# Patient Record
Sex: MALE | Race: WHITE
[De-identification: names, ages, dates, MRNs, and addresses within clinical notes are randomized per-mention and may not be internally consistent; named-entity substitution may affect disease eponyms.]

---

## 2020-11-17 ENCOUNTER — HOSPITAL ENCOUNTER (OUTPATIENT)
Dept: HOSPITAL 97 - PRE | Age: 60
Discharge: HOME | End: 2020-11-17
Attending: UROLOGY
Payer: COMMERCIAL

## 2020-11-17 VITALS — TEMPERATURE: 97.3 F | SYSTOLIC BLOOD PRESSURE: 92 MMHG | OXYGEN SATURATION: 88 % | DIASTOLIC BLOOD PRESSURE: 39 MMHG

## 2020-11-17 DIAGNOSIS — Z20.828: ICD-10-CM

## 2020-11-17 DIAGNOSIS — Z87.891: ICD-10-CM

## 2020-11-17 DIAGNOSIS — E66.9: ICD-10-CM

## 2020-11-17 DIAGNOSIS — N47.1: Primary | ICD-10-CM

## 2020-11-17 DIAGNOSIS — Z79.84: ICD-10-CM

## 2020-11-17 DIAGNOSIS — I10: ICD-10-CM

## 2020-11-17 DIAGNOSIS — N47.7: ICD-10-CM

## 2020-11-17 DIAGNOSIS — E11.9: ICD-10-CM

## 2020-11-17 LAB
BUN BLD-MCNC: 14 MG/DL (ref 7–18)
GLUCOSE SERPLBLD-MCNC: 104 MG/DL (ref 74–106)
HCT VFR BLD CALC: 49.1 % (ref 39.6–49)
INR BLD: 1.03
LYMPHOCYTES # SPEC AUTO: 1.6 K/UL (ref 0.7–4.9)
PMV BLD: 8.4 FL (ref 7.6–11.3)
POTASSIUM SERPL-SCNC: 3.6 MMOL/L (ref 3.5–5.1)
RBC # BLD: 5.43 M/UL (ref 4.33–5.43)

## 2020-11-17 PROCEDURE — 71046 X-RAY EXAM CHEST 2 VIEWS: CPT

## 2020-11-17 PROCEDURE — 85025 COMPLETE CBC W/AUTO DIFF WBC: CPT

## 2020-11-17 PROCEDURE — 0VTTXZZ RESECTION OF PREPUCE, EXTERNAL APPROACH: ICD-10-PCS

## 2020-11-17 PROCEDURE — 85730 THROMBOPLASTIN TIME PARTIAL: CPT

## 2020-11-17 PROCEDURE — 93005 ELECTROCARDIOGRAM TRACING: CPT

## 2020-11-17 PROCEDURE — 85610 PROTHROMBIN TIME: CPT

## 2020-11-17 PROCEDURE — 36415 COLL VENOUS BLD VENIPUNCTURE: CPT

## 2020-11-17 PROCEDURE — 82947 ASSAY GLUCOSE BLOOD QUANT: CPT

## 2020-11-17 PROCEDURE — 80048 BASIC METABOLIC PNL TOTAL CA: CPT

## 2020-11-17 PROCEDURE — 88304 TISSUE EXAM BY PATHOLOGIST: CPT

## 2020-11-17 NOTE — XMS REPORT
Continuity of Care Document

                          Created on:2020



Patient:NATHEN HUDSON

Sex:Male

:1960

External Reference #:[id number]





Demographics







                          Address                   [address] [place] [address]

 

                          Home Phone                [phone]

 

                          Work Phone                [phone]

 

                          Email Address             [email or web address]

 

                          Preferred Language        English

 

                          Marital Status            Unknown

 

                          Adventism Affiliation     Unknown

 

                          Race                      Unknown

 

                          Additional Race(s)        White



                                                    Unavailable

 

                          Ethnic Group              Not  or 









Author







                          Organization              [facility]

t

 

                          Address                   [address]



                                                    [place] [address]

 

                          Phone                     [phone]









Support







                Name            Relationship    Address         Phone

 

                ESTUARDO HERNDON DO Emergency Provider [address]      [phone]

[address]



                                                [place] [address] 

 

                ÁNGELA RUBIO       Primary Care Physician [address] [place] [phone]



                                                [address]       



                                                [place] [address] 

 

                TRISTAN        Next of Kin     [address]      [phone]



                                                [place] [address] 









Care Team Providers







                    Name                Role                Phone

 

                    Unavailable         Unavailable         Unavailable









Problems







       Condition Condition Condition Status Onset  Resolution Last   Treating Co

mments 

Source



       Name   Details Category        Date   Date   Treatment Clinician        



                                                 Date                 

 

       Adjustment Adjustment Problem Active 2020-0                             M

[facility]



       disorder Disorder               3-20                               da



                                   00:00:                             [facility]



                                   00                                 al



                                                                      Health



                                                                      Outreac



                                                                      h



                                                                      Program

 

       Impotence Impotence Problem Active                                    [facility]

[facility]



                                                                      da



                                                                      Medical



                                                                      Group

 

       Carpal Carpal Problem Active                                    [facility]



       tunnel Tunnel                                                  da



       syndrome Syndrome                                                  Medica

l



                                                                      Group

 

       Otitis Otitis Problem Active                                    [facility]



       externa Externa                                                  da



                                                                      Medical



                                                                      Group

 

       Serous Serous Problem Active                                    [facility]



       otitis Otitis                                                  da



       media  Media                                                   Medical



                                                                      Group

 

       Tinnitus Tinnitus Problem Active                                    [facility]

or



                                                                      da



                                                                      Medical



                                                                      Group

 

       Otalgia Otalgia Problem Active                                    [facility]



                                                                      da



                                                                      Medical



                                                                      Group

 

       Hearing Hearing Problem Active                                    [facility]



       loss   Loss                                                    da



                                                                      Medical



                                                                      Group

 

       Essential Essential Problem Active                                    [facility]

[facility]



       hypertensi Hypertensi                                                  da



       on     on                                                      Medical



                                                                      Group

 

       Allergic Allergic Problem Active                                    [facility]

or



       rhinitis Rhinitis                                                  da



                                                                      Medical



                                                                      Group

 

       Skin   Skin   Problem Active                                    [facility]



       lesion Lesion                                                  da



                                                                      Medical



                                                                      Group

 

       Fatigue Fatigue Problem Active                                    [facility]



                                                                      da



                                                                      Medical



                                                                      Group

 

       Paresthesi Paresthesi Problem Active                                    [facility]

[facility]



       a      a                                                       da



                                                                      Medical



                                                                      Group







Allergies, Adverse Reactions, Alerts

This patient has no known allergies or adverse reactions.



Social History







                Smoking Status  Start Date      Stop Date       Source

 

                Former Smoker                                   [facility]

l Group







Medications







       Ordered Filled Start  Stop   Current Ordering Indication Dosage Frequency

 Signature

                    Comments            Components          Source



     Medication Medication Date Date Medication? Clinician                (SIG) 

          



     Name Name                                                   

 

     furosemide furosemide           No                       furosemide        

   [facility]



     20 mg 20 mg                                    20 mg           da



     tablet tablet                                    tablet           [facility]



                                                                 al



                                                                 Health



                                                                 Outre



                                                                 h



                                                                 Program

 

     metformin metformin           No                       metformin           

[facility]



     500 mg 500 mg                                    500 mg           da



     tablet tablet                                    tablet           [facility]



                                                                 Outre



                                                                 h



                                                                 Program

 

     olmesartan olmesartan           No                       olmesartan        

   [facility]



     20   20                                      20             da



     mg-hydrochl mg-hydrochl                                    mg-hydroch      

     [facility]



     orothiazide orothiazide                                    lorothiazi      

     al



     12.5 mg 12.5 mg                                    de 12.5 mg           [facility]

lth



     tablet tablet                                    tablet           Outre



                                                                 h



                                                                 Program

 

     pantoprazol pantoprazol           No                       pantoprazo      

     [facility]



     e 40 mg e 40 mg                                    le 40 mg           da



     tablet,arik tablet,arik                                    tablet,del      

     [facility]



     yed release yed release                                    ayed           a

l



                                                  release           [facility]



                                                                 h



                                                                 Program

 

     sulfamethox sulfamethox           No                       sulfametho      

     [facility]



     azole 800 azole 800                                    xazole 800          

 da



     mg-trimetho mg-trimetho                                    mg-trimeth      

     [facility]



     prim 160 mg prim 160 mg                                    oprim 160       

    al



     tablet tablet                                    mg tablet           [facility]



                                                                 Outre



                                                                 h



                                                                 Program

 

     tramadol 50 tramadol 50           No                       tramadol        

   [facility]



     mg tablet mg tablet                                    50 mg           da



                                                  tablet           [facility]



                                                                 Outre



                                                                 h



                                                                 Program

 

     doxycycline doxycycline           No                       doxycyclin      

     [facility]



     hyclate 100 hyclate 100                                    e hyclate       

    da



     mg capsule mg capsule                                    100 mg           M

edical



     Take 1 Take 1                                    capsule           Group



     capsule capsule                                    Take 1           



     twice a day twice a day                                    capsule         

  



     by oral by oral                                    twice a           



     route for route for                                    day by           



     10 days. 10 days.                                    oral route           



                                                  for 10           



                                                  days.           

 

     furosemide furosemide           No                       furosemide        

   [facility]



     20 mg 20 mg                                    20 mg           da



     tablet Take tablet Take                                    tablet          

 Medical



     1 tablet 1 tablet                                    Take 1           Group



     every day every day                                    tablet           



     by oral by oral                                    every day           



     route for route for                                    by oral           



     30 days. 30 days.                                    route for           



                                                  30 days.           

 

     metformin metformin           No                       metformin           

[facility]



     500 mg 500 mg                                    500 mg           da



     tablet Take tablet Take                                    tablet          

 Medical



     1 tablet 1 tablet                                    Take 1           Group



     every day every day                                    tablet           



     by oral by oral                                    every day           



     route for route for                                    by oral           



     30 days. 30 days.                                    route for           



                                                  30 days.           

 

     olmesartan olmesartan           No                       olmesartan        

   [facility]



     20   20                                      20             da



     mg-hydrochl mg-hydrochl                                    mg-hydroch      

     Medical



     orothiazide orothiazide                                    lorothiazi      

     Group



     12.5 mg 12.5 mg                                    de 12.5 mg           



     tablet TAKE tablet TAKE                                    tablet          

 



     1 TABLET BY 1 TABLET BY                                    TAKE 1          

 



     MOUTH MOUTH                                    TABLET BY           



     DAILY DAILY                                    MOUTH           



                                                  DAILY           







Immunizations







           Ordered Immunization Filled Immunization Date       Status     Commen

ts   Source



           Name       Name                                        

 

           Tdap       Tdap       2019 Completed             [facility]



                                 09:21:00                         Medical Group







Vital Signs







             Vital Name   Observation Time Observation Value Comments     Source

 

             BP Diastolic 2020 00:00:00 72 mm[Hg]                 [facility]

a [facility]



                                                                 Group

 

             Height       2020 00:00:00 70 [in_i]                 [facility]

a [facility]



                                                                 Group

 

             BMI (Body Mass 2020 00:00:00 41.6 kg/m2                [facility]



             Index)                                              Group

 

             BP Systolic  2020 00:00:00 116 mm[Hg]                [facility]

a [facility]



                                                                 Group

 

             Body Weight  2020 00:00:00 290 [lb_av]               [facility]

a [facility]



                                                                 Group

 

             BP Diastolic 2020 00:00:00 72 mm[Hg]                 [facility]

a [facility]



                                                                 Group

 

             Height       2020 00:00:00 70 [in_i]                 [facility]

a [facility]



                                                                 Group

 

             BMI (Body Mass 2020 00:00:00 42.3 kg/m2                [facility]

rda Medical



             Index)                                              Group

 

             BP Systolic  2020 00:00:00 141 mm[Hg]                [facility]

a [facility]



                                                                 Group

 

             Body Weight  2020 00:00:00 4720 [oz_av]              [facility]

a [facility]



                                                                 Group

 

             BP Diastolic 2020 00:00:00 69 mm[Hg]                 [facility]

a [facility]



                                                                 Group

 

             Height       2020 00:00:00 70 [in_i]                 [facility]

a [facility]



                                                                 Group

 

             BMI (Body Mass 2020 00:00:00 42.7 kg/m2                [facility]

[facility]da Medical



             Index)                                              Group

 

             BP Systolic  2020 00:00:00 115 mm[Hg]                [facility]

a [facility]



                                                                 Group

 

             Body Weight  2020 00:00:00 297.6 [lb_av]              [facility]

da [facility]



                                                                 Group

 

             BP Diastolic 2019 00:00:00 60 mm[Hg]                 [facility]

a [facility]



                                                                 Group

 

             Height       2019 00:00:00 70 [in_i]                 [facility]

a [facility]



                                                                 Group

 

             BMI (Body Mass 2019 00:00:00 40.2 kg/m2                [facility]

[facility]



             Index)                                              Group

 

             BP Systolic  2019 00:00:00 104 mm[Hg]                [facility]

a [facility]



                                                                 Group

 

             Body Weight  2019 00:00:00 4478.4 [oz_av]              [facility]

[facility]



                                                                 Group

 

             BP Diastolic 2019 00:00:00 73 mm[Hg]                 [facility]

a [facility]



                                                                 Group

 

             Height       2019 00:00:00 70 [in_i]                 [facility]

a [facility]



                                                                 Group

 

             BMI (Body Mass 2019 00:00:00 40.9 kg/m2                [facility]



             Index)                                              Group

 

             BP Systolic  2019 00:00:00 130 mm[Hg]                [facility]

a [facility]



                                                                 Group

 

             Body Weight  2019 00:00:00 4560 [oz_av]              [facility]

a [facility]



                                                                 Group

 

             BP Diastolic 2019-05-15 00:00:00 79 mm[Hg]                 [facility]

a [facility]



                                                                 Group

 

             Height       2019-05-15 00:00:00 70 [in_i]                 [facility]

a [facility]



                                                                 Group

 

             BMI (Body Mass 2019-05-15 00:00:00 40.9 kg/m2                [facility]



             Index)                                              Group

 

             BP Systolic  2019-05-15 00:00:00 121 mm[Hg]                [facility]

a [facility]



                                                                 Group

 

             Body Weight  2019-05-15 00:00:00 4560 [oz_av]              [facility]

a [facility]



                                                                 Group

 

             BP Diastolic 2019 00:00:00 73 mm[Hg]                 [facility]

a [facility]



                                                                 Group

 

             Height       2019 00:00:00 70 [in_i]                 [facility]

a [facility]



                                                                 Group

 

             BMI (Body Mass 2019 00:00:00 41.9 kg/m2                [facility]



             Index)                                              Group

 

             BP Systolic  2019 00:00:00 130 mm[Hg]                [facility]

a [facility]



                                                                 Group

 

             Body Weight  2019 00:00:00 4675 [oz_av]              [facility]

a [facility]



                                                                 Group

 

             BP Diastolic 2019 00:00:00 72 mm[Hg]                 [facility]

a [facility]



                                                                 Group

 

             Height       2019 00:00:00 70 [in_i]                 [facility]

a [facility]



                                                                 Group

 

             BMI (Body Mass 2019 00:00:00 41.7 kg/m2                [facility]



             Index)                                              Group

 

             BP Systolic  2019 00:00:00 136 mm[Hg]                [facility]

a [facility]



                                                                 Group

 

             Body Weight  2019 00:00:00 4648 [oz_av]              [facility]

a [facility]



                                                                 Group

 

             BP Diastolic 2018 00:00:00 73 mm[Hg]                 [facility]

a [facility]



                                                                 Group

 

             Height       2018 00:00:00 70 [in_i]                 [facility]

a [facility]



                                                                 Group

 

             BMI (Body Mass 2018 00:00:00 40.7 kg/m2                [facility]

[facility]



             Index)                                              Group

 

             BP Systolic  2018 00:00:00 124 mm[Hg]                [facility]

a [facility]



                                                                 Group

 

             Body Weight  2018 00:00:00 284 [lb_av]               [facility]

a [facility]



                                                                 Group







Procedures







                Procedure       Date / Time     Performing Clinician Source



                                Performed                       

 

                TYMPANOMETRY    2020 00:00:00                 [facility]

[facility]



                                                                Group

 

                US, liver       2020 00:00:00                 [facility]

[facility]



                                                                Group

 

                TYMPANOMETRY    2020 00:00:00                 [facility]

[facility]



                                                                Group

 

                Colonoscopy     2015 00:00:00                 [facility]

[facility]



                                                                Group

 

                Cleft Palate/Lip Repair 2014 00:00:00                 [facility]



                                                                Group

 

                Circumcision                                  [facility]

ed[facility]



                                                                Group

 

                Carpal Tunnel Surgery                                 [facility] 

Medical



                                                                Group







Plan of Care







             Planned Activity Planned Date Details      Comments     Source

 

             Future Appointment 2021   Mariella Gaytan



                          08:45:00     [facility];              Group



                                       [address], [place] [address]              







Encounters







        Start   End     Encounter Admission Attending Care    Care    Encounter 

Source



        Date/Time Date/Time Type    Type    Clinicians Facility Department ID   

   

 

        2020 PRATIMA Gaytan     [place] -    [id number]

8 [facility]



        00:00:00 00:00:00 MD: Mairella Bauman         [facility]                         Network         Group



                        [id number] [facility],                         Otol[place]ol         



                        [place]                              [facility]         



                        [address]                                         



                        , Ph.                                           



                        [phone]                                         

 

        2020 Thee MONTIEL     [place] -    [id number] 

[place]



        00:00:00 00:00:00 Wilfredo Bauman         



                        Ángela                         Medical         Medical



                        MD: [address] [place] [address]          



                        [address],                         [place], [place]                                              



                        [address]                                         



                        , Ph.                                           



                        [phone]                                           



                        [phone]                                         

 

        2020 Alix MONTIEL     [place] -    [id number] 

[place]



        00:00:00 00:00:00 MD Yolande:                         [facility]         da



                        [address] [place] [address],                         [place] -         



                        [address],                         Otolaryngol         



                        [place],                         [facility]                                              



                        [address]                                         



                        , Ph.                                           



                        [phone])                                           



                        [phone]                                         

 

        2020 Lan KNAPP   [place] -    [id number] M

[place]



        00:00:00 00:00:00 Vangie Garcia



                        PSYD: 1700                         [facility]         Epi

scop



                        Marcus                          [facility]         al



                        [place] [place] [facility]



                        [address]                                         h



                        , Ph.                                           Program



                        [phone]                                           



                        2019 Thee                 [facility]     [place] -    [id number] 

[place]



        00:00:00 00:00:00 Celina Barrios



                        MD: [address] [place] [address]          



                        [address],                         [place]                                              



                        [address]                                         



                        , Ph.                                           



                        [phone]                                           



                        [phone]                                         

 

        2019 Thee MONTIEL     [place] -    [id number] 

[place]



        00:00:00 00:00:00 Celina Barrios



                        MD: [address] [place] [address] 201,                         [place]                                              



                        [address]                                         



                        , Ph.                                           



                        [phone]                                           



                        [phone]                                         

 

        2019-05-15 2019-05-15 Thee MONTIEL     [place] -    [id number] 

[place]



        00:00:00 00:00:00 Celina Barrios



                        MD: [address] [place] [address] 201,                         [place]                                              



                        [address]                                         



                        , Ph.                                           



                        [phone]                                           



                        [phone]                                         

 

        2019 Thee MONTIEL     [place] -    [id number] 

[place]



        00:00:00 00:00:00 Celina Barrios



                        MD: [address] [place] [address] 201,                         [place]                                              



                        [address]                                         



                        , Ph.                                           



                        [phone]                                           



                        [phone]                                         

 

        2019 Thee                 [facility]     [place] -    [id number] 

[place]



        00:00:00 00:00:00 Celina Barrios



                        MD: [address] [place] [address],                         [place]                                              



                        [address]                                         



                        , Ph.                                           



                        [phone]                                           



                        [phone]                                         

 

        2018 Ashish                 [facility]     [place] -    [id number] M

[place]



        00:00:00 00:00:00 [facility] katharine Scott



                        MD: [address] [place] [address] 1,                         Urology         



                        [place]                                              



                        [address]                                         



                        , Ph.                                           



                        [phone]                                           



                        [phone]                                         

 

        2018 Tara                   [facility]     [place] -    [id number] M

[place]



        00:00:00 00:00:00 [facility] katharine Whitt



                        NP: [address] [place] [address]                                              



                        [address]                                         



                        , Ph.                                           



                        [phone]                                           



                        [phone]                                         

 

        2018 Thee                 [facility]     [place] -    [id number] 

[place]



        00:00:00 00:00:00 Celina Barrios



                        MD: [address] [place] [address],                         [place]                                              



                        [address]                                         



                        , Ph.                                           



                        [phone]                                           



                        [phone]                                         







Results







           Test Description Test Time  Test Comments Results    Result Comments 

Source









                    Comprehensive metabolic 2000 panel - Serum or Plasma 2020 12:59:00 









                      Test Item  Value      Reference Range Interpretation Comme

nts









             glucose (test code = glucose) 149 mg/dL           H          

  

 

             Urea nitrogen [Mass/volume] in Serum or Plasma (test code = 15 mg/d

L     6                      



             3094-0)                                             

 

             osmolality calculated,serum (test code = osmolality 279 mOsm/kg  28

0-300      L            



             calculated,serum)                                        

 

             creatinine (test code = creatinine) 0.9 mg/dL    0.70-1.20         

        

 

             glomerular filtration rate (test code = glomerular >60.00          

                       



             filtration rate)                                        

 

             Urea nitrogen/Creatinine [Mass Ratio] in Serum or Plasma 16.7      

   12-20                     



             (test code = 3097-3)                                        

 

             sodium level (test code = sodium level) 138 mmol/L   135-145       

            

 

             Potassium [Moles/volume] in Body fluid (test code = 2821-7) 4.2 mmo

l/L   3.5-5.2                   

 

             chloride level (test code = chloride level) 98 mmol/L        

                

 

             CO2 (test code = CO2) 33 mmol/L    21-32        H            

 

             anion gap (test code = anion gap) 11.2 mEq/L   12-20        L      

      

 

             calcium level (test code = calcium level) 9.6 mg/dL    8.6-10.0    

              

 

             total protein (test code = total protein) 7.4 g/dL     6.6-8.7     

              

 

             albumin (test code = albumin) 4.1 g/dL     3.5-5.2                 

  

 

             globulin (test code = globulin) 3.3 gm/dL                          

    

 

             A/G ratio (test code = A/G ratio) 1.2          >1.0                

      

 

             bilirubin,total (test code = bilirubin,total) 0.3 mg/dL    0.0-1.2 

                  

 

             AST/SGOT (test code = AST/SGOT) 42 U/L       15-40        H        

    

 

             Alanine aminotransferase [Enzymatic activity/volume] in 42 U/L     

  0-41         H            



             Serum or Plasma (test code = 1742-6)                               

         

 

             Alkaline phosphatase [Enzymatic activity/volume] in Serum 80 U/L   

                        



             or Plasma (test code = 6768-6)                                     

   



[facility] or [id number] 12:59:00





             Test Item    Value        Reference Range Interpretation Comments

 

             cholesterol level (test code = 132 mg/dL    150-200      L         

   



             cholesterol level)                                        

 

             triglycerides level (test code = 173 mg/dL    <150         H       

     



             triglycerides level)                                        

 

             HDL cholesterol (test code = HDL 35 mg/dL     >55          L       

     



             cholesterol)                                        

 

             LDL cholesterol direct (test code = 86 mg/dL     <100              

        



             LDL cholesterol direct)                                        

 

             cholesterol risk ratio (test code = 3.771                          

        



             cholesterol risk ratio)                                        



[facility] [Mass/volume] in [id number] 12:59:00





             Test Item    Value        Reference Range Interpretation Comments

 

             Hemoglobin A1c [Mass/volume] in Blood 6.5 %        4.0-6.0      H  

          



             (test code = 95651-0)                                        



[facility] Center[facility] [Units/volume] in Serum or [id number] 
12:59:00





             Test Item    Value        Reference Range Interpretation Comments

 

             Thyrotropin [Units/volume] in 1.54 uIU/mL  0.36-3.74               

  



             Serum or Plasma (test code =                                       

 



             3016-3)                                             



[facility] Ce[id number] 14:53:40





             Test Item    Value        Reference Range Interpretation Comments

 

             Right (test code = Type C Peak is on Left                          

 



             Right)                                              

 

             Left (test code = Left) Type B Curve Flat                          

 



[facility] [id number] 14:53:40





             Test Item    Value        Reference Range Interpretation Comments

 

             Right (test code = Type C Peak is on Left                          

 



             Right)                                              

 

             Left (test code = Left) Type B Curve Flat                          

 



[facility] [Mass/volume] in [id number] 06:25:00





             Test Item    Value        Reference Range Interpretation Comments

 

             Hemoglobin A1c in Blood (test code = 5.8 %        4.0-6.0          

         



             25989-4)                                            



[facility], serum or [id number] 06:25:00





             Test Item    Value        Reference Range Interpretation Comments

 

             total prostate screening (test 0.20 NG/mL   0.0-4.00               

   



             code = total prostate screening)                                   

     



[facility] 2000 [facility] - Serum or Plasma
2019 06:25:00





             Test Item    Value        Reference Range Interpretation Comments

 

             Glucose [Mass/volume] in Serum or 108 mg/dL           H      

      



             Plasma (test code = 2345-7)                                        

 

             Urea nitrogen [Mass/volume] in 14 mg/dL     6-20                   

   



             Serum or Plasma (test code =                                       

 



             3094-0)                                             

 

             Osmolality of Serum or Plasma 279          280-300      L          

  



             (test code = 2692-2)                                        

 

             creatinine (test code = 1.0 mg/dL    0.70-1.20                 



             creatinine)                                         

 

             glomerular filtration rate (test >60.00                            

     



             code = glomerular filtration rate)                                 

       

 

             Urea nitrogen/Creatinine [Mass 14.0         12-20                  

   



             Ratio] in Serum or Plasma (test                                    

    



             code = 3097-3)                                        

 

             sodium level (test code = sodium 139 mmol/L   135-145              

     



             level)                                              

 

             potassium level (test code = 4.2 mmol/L   3.5-5.2                  

 



             potassium level)                                        

 

             chloride level (test code = 99 mmol/L                        



             chloride level)                                        

 

             CO2 (test code = CO2) 25 mmol/L    21-32                     

 

             anion gap (test code = anion gap) 19.2 mEq/L   12-20               

      

 

             calcium level (test code = calcium 9.2 mg/dL    8.6-10.0           

       



             level)                                              

 

             total protein (test code = total 7.7 g/dL     6.6-8.7              

     



             protein)                                            

 

             albumin (test code = albumin) 4.0 g/dL     3.5-5.2                 

  

 

             globulin (test code = globulin) 3.7 gm/dL                          

    

 

             A/G ratio (test code = A/G ratio) 1.1          >1.0                

      

 

             bilirubin,total (test code = 0.4 mg/dL    0.0-1.2                  

 



             bilirubin,total)                                        

 

             AST/SGOT (test code = AST/SGOT) 33 U/L       15-40                 

    

 

             Alanine aminotransferase 33 U/L       0-41                      



             [Enzymatic activity/volume] in                                     

   



             Serum or Plasma (test code =                                       

 



             1742-6)                                             

 

             Alkaline phosphatase [Enzymatic 88 U/L                       

    



             activity/volume] in Serum or                                       

 



             Plasma (test code = 6768-6)                                        



[facility] - Serum or [id number] 06:25:00





             Test Item    Value        Reference Range Interpretation Comments

 

             cholesterol level (test code = 137 mg/dL    150-200      L         

   



             cholesterol level)                                        

 

             triglycerides level (test code = 94 mg/dL     <150                 

     



             triglycerides level)                                        

 

             HDL cholesterol (test code = HDL 38 mg/dL     >55          L       

     



             cholesterol)                                        

 

             LDL cholesterol direct (test code = 91 mg/dL     <100              

        



             LDL cholesterol direct)                                        

 

             cholesterol risk ratio (test code = 3.605                          

        



             cholesterol risk ratio)                                        



[facility] CenterThyrotropin [Units/volume] in Serum or [id number] 
06:25:00





             Test Item    Value        Reference Range Interpretation Comments

 

             Thyrotropin [Units/volume] in 1.89 uIU/mL  0.36-3.74               

  



             Serum or Plasma (test code =                                       

 



             3016-3)                                             



[facility] CenterHemoglobin A1c [Mass/volume] in [id number] 06:25:00





             Test Item    Value        Reference Range Interpretation Comments

 

             Deprecated Hemoglobin A1c in Blood 5.8 %        4.0-6.0            

       



             (test code = 55454-3)                                        



[facility] CenterPS[facility], serum or [id number] 06:25:00





             Test Item    Value        Reference Range Interpretation Comments

 

             total prostate screening (test 0.20 NG/mL   0.0-4.00               

   



             code = total prostate screening)                                   

     



[facility]Com[facility]  panel - Serum or Plasma
2019 06:25:00





             Test Item    Value        Reference Range Interpretation Comments

 

             Glucose [Mass/volume] in Serum or 108 mg/dL           H      

      



             Plasma (test code = 2345-7)                                        

 

             Urea nitrogen [Mass/volume] in 14 mg/dL     6-20                   

   



             Serum or Plasma (test code =                                       

 



             3094-0)                                             

 

             Osmolality of Serum or Plasma 279          280-300      L          

  



             (test code = 2692-2)                                        

 

             creatinine (test code = 1.0 mg/dL    0.70-1.20                 



             creatinine)                                         

 

             glomerular filtration rate (test >60.00                            

     



             code = glomerular filtration rate)                                 

       

 

             Urea nitrogen/Creatinine [Mass 14.0         12-20                  

   



             Ratio] in Serum or Plasma (test                                    

    



             code = 3097-3)                                        

 

             sodium level (test code = sodium 139 mmol/L   135-145              

     



             level)                                              

 

             potassium level (test code = 4.2 mmol/L   3.5-5.2                  

 



             potassium level)                                        

 

             chloride level (test code = 99 mmol/L                        



             chloride level)                                        

 

             CO2 (test code = CO2) 25 mmol/L    21-32                     

 

             anion gap (test code = anion gap) 19.2 mEq/L   12-20               

      

 

             calcium level (test code = calcium 9.2 mg/dL    8.6-10.0           

       



             level)                                              

 

             total protein (test code = total 7.7 g/dL     6.6-8.7              

     



             protein)                                            

 

             albumin (test code = albumin) 4.0 g/dL     3.5-5.2                 

  

 

             globulin (test code = globulin) 3.7 gm/dL                          

    

 

             A/G ratio (test code = A/G ratio) 1.1          >1.0                

      

 

             bilirubin,total (test code = 0.4 mg/dL    0.0-1.2                  

 



             bilirubin,total)                                        

 

             AST/SGOT (test code = AST/SGOT) 33 U/L       15-40                 

    

 

             Alanine aminotransferase 33 U/L       0-41                      



             [Enzymatic activity/volume] in                                     

   



             Serum or Plasma (test code =                                       

 



             1742-6)                                             

 

             Alkaline phosphatase [Enzymatic 88 U/L                       

    



             activity/volume] in Serum or                                       

 



             Plasma (test code = 6768-6)                                        



[facility] Center[facility]  panel - Serum or [id number] 06:25:00





             Test Item    Value        Reference Range Interpretation Comments

 

             cholesterol level (test code = 137 mg/dL    150-200      L         

   



             cholesterol level)                                        

 

             triglycerides level (test code = 94 mg/dL     <150                 

     



             triglycerides level)                                        

 

             HDL cholesterol (test code = HDL 38 mg/dL     >55          L       

     



             cholesterol)                                        

 

             LDL cholesterol direct (test code = 91 mg/dL     <100              

        



             LDL cholesterol direct)                                        

 

             cholesterol risk ratio (test code = 3.605                          

        



             cholesterol risk ratio)                                        



[facility] CenterThyrotropin [Units/volume] in Serum or [id number] 
06:25:00





             Test Item    Value        Reference Range Interpretation Comments

 

             Thyrotropin [Units/volume] in 1.89 uIU/mL  0.36-3.74               

  



             Serum or Plasma (test code =                                       

 



             3016-3)                                             



[facility] CenterHemoglobin A1c [Mass/volume] in [id number] 12:17:00





             Test Item    Value        Reference Range Interpretation Comments

 

             Hemoglobin A1c in Blood (test code = 6.1 %        4.0-6.0      H   

         



             61614-1)                                            



[facility] CenterHemoglobin A1c [Mass/volume] in [id number] 12:17:00





             Test Item    Value        Reference Range Interpretation Comments

 

             Hemoglobin A1c in Blood (test code = 6.1 %        4.0-6.0      H   

         



             07590-8)                                            



[facility] CenterHemoglobin A1c [Mass/volume] in [id number] 12:17:00





             Test Item    Value        Reference Range Interpretation Comments

 

             Deprecated Hemoglobin A1c in Blood 6.1 %        4.0-6.0      H     

       



             (test code = 55454-3)                                        



[facility] CenterGlucose [[facility]/volume[facility] in Serum or [id number] 
06:30:00





             Test Item    Value        Reference Range Interpretation Comments

 

             Glucose [Mass/volume] in Serum or 106 mg/dL                  

      



             Plasma (test code = 2345-7)                                        



[facility] 1996 [facility] - Serum or [id number] 06:30:00





             Test Item    Value        Reference Range Interpretation Comments

 

             cholesterol level (test code = 143 mg/dL    150-200      L         

   



             cholesterol level)                                        

 

             triglycerides level (test code = 116 mg/dL    <150                 

     



             triglycerides level)                                        

 

             HDL cholesterol (test code = HDL 37 mg/dL     >55          L       

     



             cholesterol)                                        

 

             LDL cholesterol direct (test code = 95 mg/dL     <100              

        



             LDL cholesterol direct)                                        

 

             cholesterol risk ratio (test code = 3.864                          

        



             cholesterol risk ratio)                                        



[facility] CenterGlucose [[facility]/volume[facility] in Serum or [id number] 
06:30:00





             Test Item    Value        Reference Range Interpretation Comments

 

             Glucose [Mass/volume] in Serum or 106 mg/dL                  

      



             Plasma (test code = 2345-7)                                        



[facility] CenterLip[facility]  panel - Serum or [id number] 06:30:00





             Test Item    Value        Reference Range Interpretation Comments

 

             cholesterol level (test code = 143 mg/dL    150-200      L         

   



             cholesterol level)                                        

 

             triglycerides level (test code = 116 mg/dL    <150                 

     



             triglycerides level)                                        

 

             HDL cholesterol (test code = HDL 37 mg/dL     >55          L       

     



             cholesterol)                                        

 

             LDL cholesterol direct (test code = 95 mg/dL     <100              

        



             LDL cholesterol direct)                                        

 

             cholesterol risk ratio (test code = 3.864                          

        



             cholesterol risk ratio)                                        



[facility] CenterGlucose [[facility]/volume[facility] in Serum or [id number] 
06:30:00





             Test Item    Value        Reference Range Interpretation Comments

 

             Glucose [Mass/volume] in Serum or 106 mg/dL                  

      



             Plasma (test code = 2345-7)                                        



[facility] [id number] 06:30:00





             Test Item    Value        Reference Range Interpretation Comments

 

             cholesterol level (test code = 143 mg/dL    150-200      L         

   



             cholesterol level)                                        

 

             triglycerides level (test code = 116 mg/dL    <150                 

     



             triglycerides level)                                        

 

             HDL cholesterol (test code = HDL 37 mg/dL     >55          L       

     



             cholesterol)                                        

 

             LDL cholesterol direct (test code = 95 mg/dL     <100              

        



             LDL cholesterol direct)                                        

 

             cholesterol risk ratio (test code = 3.864                          

        



             cholesterol risk ratio)                                        



[facility] CenterErythrocyte sedimentation [id number] 06:17:00





             Test Item    Value        Reference Range Interpretation Comments

 

             erythrocyte sedimentation rate (test 20 mm/HR     0.00-20          

         



             code = erythrocyte sedimentation                                   

     



             rate)                                               



[facility]Com[facility] 2000 [facility] - Serum or Plasma
2019 06:17:00





             Test Item    Value        Reference Range Interpretation Comments

 

             glucose (test code = glucose) 106 mg/dL                      

  

 

             Urea nitrogen [Mass/volume] in 13 mg/dL     6-20                   

   



             Serum or Plasma (test code =                                       

 



             3094-0)                                             

 

             Osmolality of Serum or Plasma 278          280-300      L          

  



             (test code = 2692-2)                                        

 

             creatinine (test code = 0.7 mg/dL    0.70-1.20                 



             creatinine)                                         

 

             glomerular filtration rate (test >60.00                            

     



             code = glomerular filtration rate)                                 

       

 

             Urea nitrogen/Creatinine [Mass 18.6         12-20                  

   



             Ratio] in Serum or Plasma (test                                    

    



             code = 3097-3)                                        

 

             sodium level (test code = sodium 139 mmol/L   135-145              

     



             level)                                              

 

             Potassium [Moles/volume] in Body 4.4 mmol/L   3.5-5.2              

     



             fluid (test code = 2821-7)                                        

 

             chloride level (test code = 98 mmol/L                        



             chloride level)                                        

 

             CO2 (test code = CO2) 32 mmol/L    21-32                     

 

             anion gap (test code = anion gap) 13.4 mEq/L   12-20               

      

 

             calcium level (test code = calcium 9.6 mg/dL    8.6-10.0           

       



             level)                                              

 

             total protein (test code = total 7.6 g/dL     6.6-8.7              

     



             protein)                                            

 

             albumin (test code = albumin) 4.1 g/dL     3.5-5.2                 

  

 

             globulin (test code = globulin) 3.5 gm/dL                          

    

 

             A/G ratio (test code = A/G ratio) 1.2          >1.0                

      

 

             bilirubin,total (test code = 0.4 mg/dL    0.0-1.2                  

 



             bilirubin,total)                                        

 

             AST/SGOT (test code = AST/SGOT) 38 U/L       15-40                 

    

 

             Alanine aminotransferase 39 U/L       0-41                      



             [Enzymatic activity/volume] in                                     

   



             Serum or Plasma (test code =                                       

 



             1742-6)                                             

 

             Alkaline phosphatase [Enzymatic 83 U/L                       

    



             activity/volume] in Serum or                                       

 



             Plasma (test code = 6768-6)                                        



[facility] CenterC reactive protein [Mass/volume] in Serum or Plasma by 
High sensitivity [id number] 06:17:00





             Test Item    Value        Reference Range Interpretation Comments

 

             C-reactive protein high sens. (test 2.0 mg/L     0.0-5.0           

        



             code = C-reactive protein high                                     

   



             sens.)                                              



[facility]Lip[facility] 1996 panel - Serum or [id number] 06:17:00





             Test Item    Value        Reference Range Interpretation Comments

 

             cholesterol level (test code = 138 mg/dL    150-200      L         

   



             cholesterol level)                                        

 

             triglycerides level (test code = 88 mg/dL     <150                 

     



             triglycerides level)                                        

 

             HDL cholesterol (test code = HDL 37 mg/dL     >55          L       

     



             cholesterol)                                        

 

             LDL cholesterol direct (test code = 97 mg/dL     <100              

        



             LDL cholesterol direct)                                        

 

             cholesterol risk ratio (test code = 3.729                          

        



             cholesterol risk ratio)                                        



[facility] CenterRheumatoid factor [Titer] in [id number] 06:17:00





             Test Item    Value        Reference Range Interpretation Comments

 

             rheum factor (test code = rheum factor) <10          0-14          

            



[facility] CenterThyrotropin [Units/volume] in Serum or [id number] 
06:17:00





             Test Item    Value        Reference Range Interpretation Comments

 

             Thyrotropin [Units/volume] in 2.09 uIU/mL  0.36-3.74               

  



             Serum or Plasma (test code =                                       

 



             3016-3)                                             



[facility] CenterErythrocyte sedimentation [id number] 06:17:00





             Test Item    Value        Reference Range Interpretation Comments

 

             erythrocyte sedimentation rate (test 20 mm/HR     0.00-20          

         



             code = erythrocyte sedimentation                                   

     



             rate)                                               



[facility]Com[facility] 2000 panel - Serum or Plasma
2019 06:17:00





             Test Item    Value        Reference Range Interpretation Comments

 

             glucose (test code = glucose) 106 mg/dL                      

  

 

             Urea nitrogen [Mass/volume] in 13 mg/dL     6-20                   

   



             Serum or Plasma (test code =                                       

 



             3094-0)                                             

 

             Osmolality of Serum or Plasma 278          280-300      L          

  



             (test code = 2692-2)                                        

 

             creatinine (test code = 0.7 mg/dL    0.70-1.20                 



             creatinine)                                         

 

             glomerular filtration rate (test >60.00                            

     



             code = glomerular filtration rate)                                 

       

 

             Urea nitrogen/Creatinine [Mass 18.6         12-20                  

   



             Ratio] in Serum or Plasma (test                                    

    



             code = 3097-3)                                        

 

             sodium level (test code = sodium 139 mmol/L   135-145              

     



             level)                                              

 

             Potassium [Moles/volume] in Body 4.4 mmol/L   3.5-5.2              

     



             fluid (test code = 2821-7)                                        

 

             chloride level (test code = 98 mmol/L                        



             chloride level)                                        

 

             CO2 (test code = CO2) 32 mmol/L    21-32                     

 

             anion gap (test code = anion gap) 13.4 mEq/L   12-20               

      

 

             calcium level (test code = calcium 9.6 mg/dL    8.6-10.0           

       



             level)                                              

 

             total protein (test code = total 7.6 g/dL     6.6-8.7              

     



             protein)                                            

 

             albumin (test code = albumin) 4.1 g/dL     3.5-5.2                 

  

 

             globulin (test code = globulin) 3.5 gm/dL                          

    

 

             A/G ratio (test code = A/G ratio) 1.2          >1.0                

      

 

             bilirubin,total (test code = 0.4 mg/dL    0.0-1.2                  

 



             bilirubin,total)                                        

 

             AST/SGOT (test code = AST/SGOT) 38 U/L       15-40                 

    

 

             Alanine aminotransferase 39 U/L       0-41                      



             [Enzymatic activity/volume] in                                     

   



             Serum or Plasma (test code =                                       

 



             1742-6)                                             

 

             Alkaline phosphatase [Enzymatic 83 U/L                       

    



             activity/volume] in Serum or                                       

 



             Plasma (test code = 6768-6)                                        



[facility]C reactive protein [Mass/volume] in Serum or Plasma by 
High sensitivity [id number] 06:17:00





             Test Item    Value        Reference Range Interpretation Comments

 

             C-reactive protein high sens. (test 2.0 mg/L     0.0-5.0           

        



             code = C-reactive protein high                                     

   



             sens.)                                              



[facility] CenterLip[facility] 1996 panel - Serum or [id number] 06:17:00





             Test Item    Value        Reference Range Interpretation Comments

 

             cholesterol level (test code = 138 mg/dL    150-200      L         

   



             cholesterol level)                                        

 

             triglycerides level (test code = 88 mg/dL     <150                 

     



             triglycerides level)                                        

 

             HDL cholesterol (test code = HDL 37 mg/dL     >55          L       

     



             cholesterol)                                        

 

             LDL cholesterol direct (test code = 97 mg/dL     <100              

        



             LDL cholesterol direct)                                        

 

             cholesterol risk ratio (test code = 3.729                          

        



             cholesterol risk ratio)                                        



[facility] CenterRheumatoid factor [Titer] in [id number] 06:17:00





             Test Item    Value        Reference Range Interpretation Comments

 

             rheum factor (test code = rheum factor) <10          0-14          

            



[facility] CenterThyrotropin [Units/volume] in Serum or [id number] 
06:17:00





             Test Item    Value        Reference Range Interpretation Comments

 

             Thyrotropin [Units/volume] in 2.09 uIU/mL  0.36-3.74               

  



             Serum or Plasma (test code =                                       

 



             3016-3)                                             



[facility] CenterErythrocyte sedimentation [id number] 06:17:00





             Test Item    Value        Reference Range Interpretation Comments

 

             erythrocyte sedimentation rate (test 20 mm/HR     0.00-20          

         



             code = erythrocyte sedimentation                                   

     



             rate)                                               



[facility]Com[facility] 2000 panel - Serum or Plasma
2019 06:17:00





             Test Item    Value        Reference Range Interpretation Comments

 

             glucose (test code = glucose) 106 mg/dL                      

  

 

             Urea nitrogen [Mass/volume] in 13 mg/dL     6-20                   

   



             Serum or Plasma (test code =                                       

 



             3094-0)                                             

 

             Osmolality of Serum or Plasma 278          280-300      L          

  



             (test code = 2692-2)                                        

 

             creatinine (test code = 0.7 mg/dL    0.70-1.20                 



             creatinine)                                         

 

             glomerular filtration rate (test >60.00                            

     



             code = glomerular filtration rate)                                 

       

 

             Urea nitrogen/Creatinine [Mass 18.6         12-20                  

   



             Ratio] in Serum or Plasma (test                                    

    



             code = 3097-3)                                        

 

             sodium level (test code = sodium 139 mmol/L   135-145              

     



             level)                                              

 

             Potassium [Moles/volume] in Body 4.4 mmol/L   3.5-5.2              

     



             fluid (test code = 2821-7)                                        

 

             chloride level (test code = 98 mmol/L                        



             chloride level)                                        

 

             CO2 (test code = CO2) 32 mmol/L    21-32                     

 

             anion gap (test code = anion gap) 13.4 mEq/L   12-20               

      

 

             calcium level (test code = calcium 9.6 mg/dL    8.6-10.0           

       



             level)                                              

 

             total protein (test code = total 7.6 g/dL     6.6-8.7              

     



             protein)                                            

 

             albumin (test code = albumin) 4.1 g/dL     3.5-5.2                 

  

 

             globulin (test code = globulin) 3.5 gm/dL                          

    

 

             A/G ratio (test code = A/G ratio) 1.2          >1.0                

      

 

             bilirubin,total (test code = 0.4 mg/dL    0.0-1.2                  

 



             bilirubin,total)                                        

 

             AST/SGOT (test code = AST/SGOT) 38 U/L       15-40                 

    

 

             Alanine aminotransferase 39 U/L       0-41                      



             [Enzymatic activity/volume] in                                     

   



             Serum or Plasma (test code =                                       

 



             1742-6)                                             

 

             Alkaline phosphatase [Enzymatic 83 U/L                       

    



             activity/volume] in Serum or                                       

 



             Plasma (test code = 6768-6)                                        



[facility]C reactive protein [Mass/volume] in Serum or Plasma by 
High sensitivity [id number] 06:17:00





             Test Item    Value        Reference Range Interpretation Comments

 

             C-reactive protein high sens. (test 2.0 mg/L     0.0-5.0           

        



             code = C-reactive protein high                                     

   



             sens.)                                              



[facility] CenterLip[facility] 1996 panel - Serum or [id number] 06:17:00





             Test Item    Value        Reference Range Interpretation Comments

 

             cholesterol level (test code = 138 mg/dL    150-200      L         

   



             cholesterol level)                                        

 

             triglycerides level (test code = 88 mg/dL     <150                 

     



             triglycerides level)                                        

 

             HDL cholesterol (test code = HDL 37 mg/dL     >55          L       

     



             cholesterol)                                        

 

             LDL cholesterol direct (test code = 97 mg/dL     <100              

        



             LDL cholesterol direct)                                        

 

             cholesterol risk ratio (test code = 3.729                          

        



             cholesterol risk ratio)                                        



[facility] CenterRheumatoid factor [Titer[facility] in [id number] 06:17:00





             Test Item    Value        Reference Range Interpretation Comments

 

             rheum factor (test code = rheum factor) <10          0-14          

            



[facility] CenterThyrotropin [Units/volume] in Serum or [id number] 
06:17:00





             Test Item    Value        Reference Range Interpretation Comments

 

             Thyrotropin [Units/volume] in 2.09 uIU/mL  0.36-3.74               

  



             Serum or Plasma (test code =                                       

 



             3016-3)                                             



[facility] CenterBacteria identified in Urine by [id number] 
00:00:00[facility] 
[id number] 00:00:00[facility]

## 2020-11-17 NOTE — RAD REPORT
EXAM DESCRIPTION:  RAD - Chest Pa And Lat (2 Views) - 11/17/2020 7:03 am

 

CLINICAL HISTORY:  PREOP, STAT SAME DAY SURGERY, BED 7

 

COMPARISON:  None

 

TECHNIQUE:  Frontal and lateral views of the chest were obtained.

 

FINDINGS:  The lungs are clear.  No failure or volume overload identified. Heart size is normal and c
entral vasculature is within normal limits.  No pleural effusion or pneumothorax seen.  No acute bony
 finding noted.  No aortic abnormality.

 

IMPRESSION:  No acute cardiopulmonary process.
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
## 2020-11-17 NOTE — XMS REPORT
Continuity of Care Document

                          Created on:2020



Patient:NATHEN HUDSON

Sex:Male

:1960

External Reference #:[id number]





Demographics







                          [place] [address]

 

                          Home Phone                [phone]

 

                          Work Phone                [phone]

 

                          Email Address             NONE

 

                          Preferred Language        English

 

                          Marital Status            Unknown

 

                          Nondenominational Affiliation     Unknown

 

                          Race                      Unknown

 

                          Additional Race(s)        White



                                                    Unavailable

 

                          Ethnic Group              Not  or 









Author







                          Organization              [facility]

t

 

                          Address                   [address] [place] [address] [address]



                                                    [place] [address]

 

                          Phone                     [phone]









Support







                Name            Relationship    Address         Phone

 

                SVEN HERNDON DO Emergency Provider [address]      [phone]

[phone]



                                                [place] [address] 

 

                ÁNGELA RUBIO       Primary Care Physician [address] [phone]



                                                [address]       



                                                [place] [address] 

 

                TRISTAN        Next of Kin     [address]      [phone]



                                                [place] [address] 









Care Team Providers







                    Name                Role                Phone

 

                    Unavailable         Unavailable         Unavailable









Problems







       Condition Condition Condition Status Onset  Resolution Last   Treating Co

mments 

Source



       Name   Details Category        Date   Date   Treatment Clinician        



                                                 Date                 

 

       Adjustment Adjustment Problem Active                              M

[facility]



       disorder Disorder               3-20                               da



                                   00:00:                             [facility]



                                   00                                 al



                                                                      [facility]



                                                                      [facility]



                                                                      h



                                                                      Program

 

       Impotence Impotence Problem Active                                    [facility]

[facility]



                                                                      da



                                                                      Medical



                                                                      Group

 

       Carpal Carpal Problem Active                                    [facility]



       tunnel Tunnel                                                  da



       syndrome Syndrome                                                  Medica

l



                                                                      Group

 

       Otitis Otitis Problem Active                                    [facility]



       externa Externa                                                  da



                                                                      Medical



                                                                      Group

 

       Serous Serous Problem Active                                    [facility]



       otitis Otitis                                                  da



       media  Media                                                   Medical



                                                                      Group

 

       Tinnitus Tinnitus Problem Active                                    [facility]

or



                                                                      da



                                                                      Medical



                                                                      Group

 

       Otalgia Otalgia Problem Active                                    [facility]



                                                                      da



                                                                      Medical



                                                                      Group

 

       Hearing Hearing Problem Active                                    [facility]



       loss   Loss                                                    da



                                                                      Medical



                                                                      Group

 

       Essential Essential Problem Active                                    [facility]

[facility]



       hypertensi Hypertensi                                                  da



       on     on                                                      Medical



                                                                      Group

 

       Allergic Allergic Problem Active                                    [facility]

or



       rhinitis Rhinitis                                                  da



                                                                      Medical



                                                                      Group

 

       Skin   Skin   Problem Active                                    [facility]



       lesion Lesion                                                  da



                                                                      Medical



                                                                      Group

 

       Fatigue Fatigue Problem Active                                    [facility]



                                                                      da



                                                                      Medical



                                                                      Group

 

       Paresthesi Paresthesi Problem Active                                    M

[facility]



       a      a                                                       da



                                                                      Medical



                                                                      Group







Allergies, Adverse Reactions, Alerts

This patient has no known allergies or adverse reactions.



Social History







                Smoking Status  Start Date      Stop Date       Source

 

                Former Smoker                                   [facility]

l Group







Medications







       Ordered Filled Start  Stop   Current Ordering Indication Dosage Frequency

 Signature

                    Comments            Components          Source



     Medication Medication Date Date Medication? Clinician                (SIG) 

          



     Name Name                                                   

 

     furosemide furosemide           No                       furosemide        

   [facility]



     20 mg 20 mg                                    20 mg           da



     tablet tablet                                    tablet           [facility]



                                                                 Outre[facility]



                                                                 h



                                                                 Program

 

     metformin metformin           No                       metformin           

[facility]



     500 mg 500 mg                                    500 mg           da



     tablet tablet                                    tablet           [facility]



                                                                 Outreac



                                                                 h



                                                                 Program

 

     olmesartan olmesartan           No                       olmesartan        

   [facility]



     20   20                                      20             da



     mg-hydrochl mg-hydrochl                                    mg-hydroch      

     [facility]



     orothiazide orothiazide                                    lorothiazi      

     al



     12.5 mg 12.5 mg                                    de 12.5 mg           [facility]

lth



     tablet tablet                                    tablet           [facility]



                                                                 h



                                                                 Program

 

     pantoprazol pantoprazol           No                       pantoprazo      

     [facility]



     e 40 mg e 40 mg                                    le 40 mg           da



     tablet,arik tablet,arik                                    tablet,del      

     [facility]



     yed release yed release                                    ayed           a

l



                                                  release           [facility]



                                                                 Outre



                                                                 h



                                                                 Program

 

     sulfamethox sulfamethox           No                       sulfametho      

     [facility]



     azole 800 azole 800                                    xazole 800          

 da



     mg-trimetho mg-trimetho                                    mg-trimeth      

     [facility]



     prim 160 mg prim 160 mg                                    oprim 160       

    al



     tablet tablet                                    mg tablet           [facility]



                                                                 Outreac



                                                                 h



                                                                 Program

 

     tramadol 50 tramadol 50           No                       tramadol        

   [facility]



     mg tablet mg tablet                                    50 mg           da



                                                  tablet           [facility]



                                                                 Outreac



                                                                 h



                                                                 Program

 

     doxycycline doxycycline           No                       doxycyclin      

     [facility]



     hyclate 100 hyclate 100                                    e hyclate       

    da



     mg capsule mg capsule                                    100 mg           M

edical



     Take 1 Take 1                                    capsule           Group



     capsule capsule                                    Take 1           



     twice a day twice a day                                    capsule         

  



     by oral by oral                                    twice a           



     route for route for                                    day by           



     10 days. 10 days.                                    oral route           



                                                  for 10           



                                                  days.           

 

     furosemide furosemide           No                       furosemide        

   [facility]



     20 mg 20 mg                                    20 mg           da



     tablet Take tablet Take                                    tablet          

 Medical



     1 tablet 1 tablet                                    Take 1           Group



     every day every day                                    tablet           



     by oral by oral                                    every day           



     route for route for                                    by oral           



     30 days. 30 days.                                    route for           



                                                  30 days.           

 

     metformin metformin           No                       metformin           

[facility]



     500 mg 500 mg                                    500 mg           da



     tablet Take tablet Take                                    tablet          

 Medical



     1 tablet 1 tablet                                    Take 1           Group



     every day every day                                    tablet           



     by oral by oral                                    every day           



     route for route for                                    by oral           



     30 days. 30 days.                                    route for           



                                                  30 days.           

 

     olmesartan olmesartan           No                       olmesartan        

   [facility]



     20   20                                      20             da



     mg-hydrochl mg-hydrochl                                    mg-hydroch      

     Medical



     orothiazide orothiazide                                    lorothiazi      

     Group



     12.5 mg 12.5 mg                                    de 12.5 mg           



     tablet TAKE tablet TAKE                                    tablet          

 



     1 TABLET BY 1 TABLET BY                                    TAKE 1          

 



     MOUTH MOUTH                                    TABLET BY           



     DAILY DAILY                                    MOUTH           



                                                  DAILY           







Immunizations







           Ordered Immunization Filled Immunization Date       Status     Commen

ts   Source



           Name       Name                                        

 

           Tdap       Tdap       2019 Completed             [facility]



                                 09:21:00                         Medical Group







Vital Signs







             Vital Name   Observation Time Observation Value Comments     Source

 

             BP Diastolic 2020 00:00:00 72 mm[Hg]                 [facility]

a [facility]



                                                                 Group

 

             Height       2020 00:00:00 70 [in_i]                 [facility]

a [facility]



                                                                 Group

 

             BMI (Body Mass 2020 00:00:00 41.6 kg/m2                [facility]

rd Medical



             Index)                                              Group

 

             BP Systolic  2020 00:00:00 116 mm[Hg]                [facility]

a [facility]



                                                                 Group

 

             Body Weight  2020 00:00:00 290 [lb_av]               [facility]

a [facility]



                                                                 Group

 

             BP Diastolic 2020 00:00:00 72 mm[Hg]                 [facility]

a [facility]



                                                                 Group

 

             Height       2020 00:00:00 70 [in_i]                 [facility]

a [facility]



                                                                 Group

 

             BMI (Body Mass 2020 00:00:00 42.3 kg/m2                [facility]

[facility]



             Index)                                              Group

 

             BP Systolic  2020 00:00:00 141 mm[Hg]                [facility]

a [facility]



                                                                 Group

 

             Body Weight  2020 00:00:00 4720 [oz_av]              [facility]

a [facility]



                                                                 Group

 

             BP Diastolic 2020 00:00:00 69 mm[Hg]                 [facility]

a [facility]



                                                                 Group

 

             Height       2020 00:00:00 70 [in_i]                 [facility]

a [facility]



                                                                 Group

 

             BMI (Body Mass 2020 00:00:00 42.7 kg/m2                [facility]

[facility]



             Index)                                              Group

 

             BP Systolic  2020 00:00:00 115 mm[Hg]                [facility]

a [facility]



                                                                 Group

 

             Body Weight  2020 00:00:00 297.6 [lb_av]              [facility]

da [facility]



                                                                 Group

 

             BP Diastolic 2019 00:00:00 60 mm[Hg]                 [facility]

a [facility]



                                                                 Group

 

             Height       2019 00:00:00 70 [in_i]                 [facility]

a [facility]



                                                                 Group

 

             BMI (Body Mass 2019 00:00:00 40.2 kg/m2                [facility]

[facility]



             Index)                                              Group

 

             BP Systolic  2019 00:00:00 104 mm[Hg]                [facility]

a [facility]



                                                                 Group

 

             Body Weight  2019 00:00:00 4478.4 [oz_av]              [facility]

[facility]



                                                                 Group

 

             BP Diastolic 2019 00:00:00 73 mm[Hg]                 [facility]

a [facility]



                                                                 Group

 

             Height       2019 00:00:00 70 [in_i]                 [facility]

a [facility]



                                                                 Group

 

             BMI (Body Mass 2019 00:00:00 40.9 kg/m2                [facility]



             Index)                                              Group

 

             BP Systolic  2019 00:00:00 130 mm[Hg]                [facility]

a [facility]



                                                                 Group

 

             Body Weight  2019 00:00:00 4560 [oz_av]              [facility]

a [facility]



                                                                 Group

 

             BP Diastolic 2019-05-15 00:00:00 79 mm[Hg]                 [facility]

a [facility]



                                                                 Group

 

             Height       2019-05-15 00:00:00 70 [in_i]                 [facility]

a [facility]



                                                                 Group

 

             BMI (Body Mass 2019-05-15 00:00:00 40.9 kg/m2                [facility]



             [facility]                                              [facility]

 

             BP Systolic  2019-05-15 00:00:00 121 mm[Hg]                [facility]

a [facility]



                                                                 Group

 

             Body Weight  2019-05-15 00:00:00 4560 [oz_av]              [facility]

a [facility]



                                                                 Group

 

             BP Diastolic 2019 00:00:00 73 mm[Hg]                 [facility]

a [facility]



                                                                 Group

 

             Height       2019 00:00:00 70 [in_i]                 [facility]

a [facility]



                                                                 Group

 

             BMI (Body Mass 2019 00:00:00 41.9 kg/m2                [facility]



             [facility]                                              Group

 

             BP Systolic  2019 00:00:00 130 mm[Hg]                [facility]

a [facility]



                                                                 Group

 

             Body Weight  2019 00:00:00 4675 [oz_av]              [facility]

a [facility]



                                                                 Group

 

             BP Diastolic 2019 00:00:00 72 mm[Hg]                 [facility]

a [facility]



                                                                 Group

 

             Height       2019 00:00:00 70 [in_i]                 [facility]

a [facility]



                                                                 Group

 

             BMI (Body Mass 2019 00:00:00 41.7 kg/m2                [facility]



             [facility]                                              Group

 

             BP Systolic  2019 00:00:00 136 mm[Hg]                [facility]

a [facility]



                                                                 Group

 

             Body Weight  2019 00:00:00 4648 [oz_av]              [facility]

a [facility]



                                                                 Group

 

             BP Diastolic 2018 00:00:00 73 mm[Hg]                 [facility]

a [facility]



                                                                 Group

 

             Height       2018 00:00:00 70 [in_i]                 [facility]

a [facility]



                                                                 Group

 

             BMI (Body Mass 2018 00:00:00 40.7 kg/m2                [facility]

[facility]



             Index)                                              Group

 

             BP Systolic  2018 00:00:00 124 mm[Hg]                [facility]

a [facility]



                                                                 Group

 

             Body Weight  2018 00:00:00 284 [lb_av]               [facility]

a [facility]



                                                                 Group







Procedures







                Procedure       Date / Time     Performing Clinician Source



                                Performed                       

 

                TYMPANOMETRY    2020 00:00:00                 [facility]

[facility]



                                                                Group

 

                US, liver       2020 00:00:00                 [facility]

[facility]



                                                                Group

 

                TYMPANOMETRY    2020 00:00:00                 [facility]

[facility]



                                                                Group

 

                Colonoscopy     2015 00:00:00                 [facility]

[facility]



                                                                Group

 

                Cleft Palate/Lip Repair 2014 00:00:00                 [facility]

[facility]



                                                                Group

 

                Circumcision                                  [facility]

edical



                                                                Group

 

                Carpal Tunnel Surgery                                 [facility] 

Medical



                                                                Group







Plan of Care







             Planned Activity Planned Date Details      Comments     Source

 

             Future Appointment 2021   Mariella Gaytan



                          08:45:00     [facility]



                                       [address] [address]              







Encounters







        Start   End     Encounter Admission Attending Care    Care    Encounter 

Source



        Date/Time Date/Time Type    Type    Clinicians Facility Department ID   

   

 

        2020 PRATIMA Gaytan     [place] -    [id number]

8 [facility]



        00:00:00 00:00:00 MD: Mariella Bauman         [facility]         Group



                        [address] [facility],                         Otolaryngol         



                        [facility]         



                        [address]                                         



                        , Ph.                                           



                        [phone]                                         

 

        2020 Thee ANDUJAR     [place] -    [id number] 

[place]



        00:00:00 00:00:00 [facility]



                        MD: Mariella                         [facility]                          Family          



                        [address],                         [place], [place]                                              



                        [address]                                         



                        , Ph.                                           



                        [phone])                                           



                        [phone]                                         

 

        2020 Alix MONTIEL     [place] -    [id number] 

[place]



        00:00:00 00:00:00 MD Yolande:                         [facility]         da



                        [address] [place] [address],                         [place] -         



                        [address],                         Otolaryngol         



                        [place],                         [facility]                                              



                        [address]                                         



                        , Ph.                                           



                        [phone])                                           



                        [phone]                                         

 

        2020 Lan KNAPP   [place] -    [id number] M

[place]



        00:00:00 00:00:00 Vangie Garcia



                        PSYD: 1700                         [facility]         Epi

scop



                        Marcus                          [facility]         al



                        [place] [place] [facility]



                        [address]                                         h



                        , Ph.                                           Program



                        [phone])                                           



                        -2019 Thee                 [facility]     [place] -    [id number] 

[place]



        00:00:00 00:00:00 Celina Barrios



                        MD: [address] [place] [address]          



                        [address],                         [place]                                              



                        [address]                                         



                        , Ph.                                           



                        [phone])                                           



                        [phone]                                         

 

        2019 Thee                 [facility]     [place] -    [id number] 

[place]



        00:00:00 00:00:00 Celina Barrios



                        MD: [address] [place] [address] 201,                         [place]                                              



                        [address]                                         



                        , Ph.                                           



                        [phone])                                           



                        [phone]                                         

 

        2019-05-15 2019-05-15 Thee MONTIEL     [place] -    [id number] 

[place]



        00:00:00 00:00:00 Celina Barrios



                        MD: [address] [place] [address] 201,                         [place]                                              



                        [address]                                         



                        , Ph.                                           



                        [phone])                                           



                        [phone]                                         

 

        2019 Thee MONTIEL     [place] -    [id number] 

[place]



        00:00:00 00:00:00 Celina Barrios



                        MD: [address] [place] [address] 201,                         [place]                                              



                        [address]                                         



                        , Ph.                                           



                        [phone]                                           



                        [phone]                                         

 

        2019 Thee                 [facility]     [place] -    [id number] 

[place]



        00:00:00 00:00:00 Celina Barrios



                        MD: [address] [place] [address] 201,                         [place]                                              



                        [address]                                         



                        , Ph.                                           



                        [phone])                                           



                        [phone]                                         

 

        2018 Ashish                 [facility]     [place] -    [id number] M

[place]



        00:00:00 00:00:00 [facility] katharine Scott



                        MD: [address] [place] [address] 1,                         Urology         



                        [place]                                              



                        [address]                                         



                        , Ph.                                           



                        [phone]                                           



                        [phone]                                         

 

        2018 Tara                   [facility]     [place] -    [id number] M

[place]



        00:00:00 00:00:00 [facility] katharine Whitt



                        NP: [address] [place] [address]                                              



                        [address]                                         



                        , Ph.                                           



                        [phone]                                           



                        [phone]                                         

 

        2018 Thee                 [facility]     [place] -    [id number] 

[place]



        00:00:00 00:00:00 Celina Barrios



                        MD: [address] [place] [address]                                              



                        [address]                                         



                        , Ph.                                           



                        [phone]                                           



                        [phone]                                         







Results







           Test Description Test Time  Test Comments Results    Result Comments 

Source









                    Comprehensive metabolic 2000 panel - Serum or Plasma 2020 12:59:00 









                      Test Item  Value      Reference Range Interpretation Comme

nts









             glucose (test code = glucose) 149 mg/dL           H          

  

 

             Urea nitrogen [Mass/volume] in Serum or Plasma (test code = 15 mg/d

L                           



             3094-0)                                             

 

             osmolality calculated,serum (test code = osmolality 279 mOsm/kg  28

0-300      L            



             calculated,serum)                                        

 

             creatinine (test code = creatinine) 0.9 mg/dL    0.70-1.20         

        

 

             glomerular filtration rate (test code = glomerular >60.00          

                       



             filtration rate)                                        

 

             Urea nitrogen/Creatinine [Mass Ratio] in Serum or Plasma 16.7      

   12-20                     



             (test code = 3097-3)                                        

 

             sodium level (test code = sodium level) 138 mmol/L   135-145       

            

 

             Potassium [Moles/volume] in Body fluid (test code = 2821-7) 4.2 mmo

l/L   3.5-5.2                   

 

             chloride level (test code = chloride level) 98 mmol/L        

                

 

             CO2 (test code = CO2) 33 mmol/L    21-32        H            

 

             anion gap (test code = anion gap) 11.2 mEq/L   12-20        L      

      

 

             calcium level (test code = calcium level) 9.6 mg/dL    8.6-10.0    

              

 

             total protein (test code = total protein) 7.4 g/dL     6.6-8.7     

              

 

             albumin (test code = albumin) 4.1 g/dL     3.5-5.2                 

  

 

             globulin (test code = globulin) 3.3 gm/dL                          

    

 

             A/G ratio (test code = A/G ratio) 1.2          >1.0                

      

 

             bilirubin,total (test code = bilirubin,total) 0.3 mg/dL    0.0-1.2 

                  

 

             AST/SGOT (test code = AST/SGOT) 42 U/L       15-40        H        

    

 

             Alanine aminotransferase [Enzymatic activity/volume] in 42 U/L     

  0-41         H            



             Serum or Plasma (test code = 1742-6)                               

         

 

             Alkaline phosphatase [Enzymatic activity/volume] in Serum 80 U/L   

                        



             or Plasma (test code = 6768-6)                                     

   



[facility] or [id number] 12:59:00





             Test Item    Value        Reference Range Interpretation Comments

 

             cholesterol level (test code = 132 mg/dL    150-200      L         

   



             cholesterol level)                                        

 

             triglycerides level (test code = 173 mg/dL    <150         H       

     



             triglycerides level)                                        

 

             HDL cholesterol (test code = HDL 35 mg/dL     >55          L       

     



             cholesterol)                                        

 

             LDL cholesterol direct (test code = 86 mg/dL     <100              

        



             LDL cholesterol direct)                                        

 

             cholesterol risk ratio (test code = 3.771                          

        



             cholesterol risk ratio)                                        



[facility] [Mass/volume] in [id number] 12:59:00





             Test Item    Value        Reference Range Interpretation Comments

 

             Hemoglobin A1c [Mass/volume] in Blood 6.5 %        4.0-6.0      H  

          



             (test code = 87066-3)                                        



[facility] Center[facility] [Units/volume] in Serum or [id number] 
12:59:00





             Test Item    Value        Reference Range Interpretation Comments

 

             Thyrotropin [Units/volume] in 1.54 uIU/mL  0.36-3.74               

  



             Serum or Plasma (test code =                                       

 



             3016-3)                                             



[facility]2020-05-04 14:53:40





             Test Item    Value        Reference Range Interpretation Comments

 

             Right (test code = Type C Peak is on Left                          

 



             Right)                                              

 

             Left (test code = Left) Type B Curve Flat                          

 



[facility] Ce[id number] 14:53:40





             Test Item    Value        Reference Range Interpretation Comments

 

             Right (test code = Type C Peak is on Left                          

 



             Right)                                              

 

             Left (test code = Left) Type B Curve Flat                          

 



[facility] [Mass/volume] in [id number] 06:25:00





             Test Item    Value        Reference Range Interpretation Comments

 

             Hemoglobin A1c in Blood (test code = 5.8 %        4.0-6.0          

         



             60911-3)                                            



[facility], serum or [id number] 06:25:00





             Test Item    Value        Reference Range Interpretation Comments

 

             total prostate screening (test 0.20 NG/mL   0.0-4.00               

   



             code = total prostate screening)                                   

     



[facility] 2000 [facility] - Serum or Plasma
2019 06:25:00





             Test Item    Value        Reference Range Interpretation Comments

 

             Glucose [Mass/volume] in Serum or 108 mg/dL           H      

      



             Plasma (test code = 2345-7)                                        

 

             Urea nitrogen [Mass/volume] in 14 mg/dL     6-20                   

   



             Serum or Plasma (test code =                                       

 



             3094-0)                                             

 

             Osmolality of Serum or Plasma 279          280-300      L          

  



             (test code = 2692-2)                                        

 

             creatinine (test code = 1.0 mg/dL    0.70-1.20                 



             creatinine)                                         

 

             glomerular filtration rate (test >60.00                            

     



             code = glomerular filtration rate)                                 

       

 

             Urea nitrogen/Creatinine [Mass 14.0         12-20                  

   



             Ratio] in Serum or Plasma (test                                    

    



             code = 3097-3)                                        

 

             sodium level (test code = sodium 139 mmol/L   135-145              

     



             level)                                              

 

             potassium level (test code = 4.2 mmol/L   3.5-5.2                  

 



             potassium level)                                        

 

             chloride level (test code = 99 mmol/L                        



             chloride level)                                        

 

             CO2 (test code = CO2) 25 mmol/L    21-32                     

 

             anion gap (test code = anion gap) 19.2 mEq/L   12-20               

      

 

             calcium level (test code = calcium 9.2 mg/dL    8.6-10.0           

       



             level)                                              

 

             total protein (test code = total 7.7 g/dL     6.6-8.7              

     



             protein)                                            

 

             albumin (test code = albumin) 4.0 g/dL     3.5-5.2                 

  

 

             globulin (test code = globulin) 3.7 gm/dL                          

    

 

             A/G ratio (test code = A/G ratio) 1.1          >1.0                

      

 

             bilirubin,total (test code = 0.4 mg/dL    0.0-1.2                  

 



             bilirubin,total)                                        

 

             AST/SGOT (test code = AST/SGOT) 33 U/L       15-40                 

    

 

             Alanine aminotransferase 33 U/L       0-41                      



             [Enzymatic activity/volume] in                                     

   



             Serum or Plasma (test code =                                       

 



             1742-6)                                             

 

             Alkaline phosphatase [Enzymatic 88 U/L                       

    



             activity/volume] in Serum or                                       

 



             Plasma (test code = 6768-6)                                        



[facility] - Serum or [id number] 06:25:00





             Test Item    Value        Reference Range Interpretation Comments

 

             cholesterol level (test code = 137 mg/dL    150-200      L         

   



             cholesterol level)                                        

 

             triglycerides level (test code = 94 mg/dL     <150                 

     



             triglycerides level)                                        

 

             HDL cholesterol (test code = HDL 38 mg/dL     >55          L       

     



             cholesterol)                                        

 

             LDL cholesterol direct (test code = 91 mg/dL     <100              

        



             LDL cholesterol direct)                                        

 

             cholesterol risk ratio (test code = 3.605                          

        



             cholesterol risk ratio)                                        



[facility] CenterThyrotropin [Units/volume] in Serum or [id number] 
06:25:00





             Test Item    Value        Reference Range Interpretation Comments

 

             Thyrotropin [Units/volume] in 1.89 uIU/mL  0.36-3.74               

  



             Serum or Plasma (test code =                                       

 



             3016-3)                                             



[facility] CenterHemoglobin A1c [Mass/volume] in [id number] 06:25:00





             Test Item    Value        Reference Range Interpretation Comments

 

             Deprecated Hemoglobin A1c in Blood 5.8 %        4.0-6.0            

       



             (test code = 55454-3)                                        



[facility], serum or [id number] 06:25:00





             Test Item    Value        Reference Range Interpretation Comments

 

             total prostate screening (test 0.20 NG/mL   0.0-4.00               

   



             code = total prostate screening)                                   

     



[facility] 2000 panel - Serum or Plasma
2019 06:25:00





             Test Item    Value        Reference Range Interpretation Comments

 

             Glucose [Mass/volume] in Serum or 108 mg/dL           H      

      



             Plasma (test code = 2345-7)                                        

 

             Urea nitrogen [Mass/volume] in 14 mg/dL     6-20                   

   



             Serum or Plasma (test code =                                       

 



             3094-0)                                             

 

             Osmolality of Serum or Plasma 279          280-300      L          

  



             (test code = 2692-2)                                        

 

             creatinine (test code = 1.0 mg/dL    0.70-1.20                 



             creatinine)                                         

 

             glomerular filtration rate (test >60.00                            

     



             code = glomerular filtration rate)                                 

       

 

             Urea nitrogen/Creatinine [Mass 14.0         12-20                  

   



             Ratio] in Serum or Plasma (test                                    

    



             code = 3097-3)                                        

 

             sodium level (test code = sodium 139 mmol/L   135-145              

     



             level)                                              

 

             potassium level (test code = 4.2 mmol/L   3.5-5.2                  

 



             potassium level)                                        

 

             chloride level (test code = 99 mmol/L                        



             chloride level)                                        

 

             CO2 (test code = CO2) 25 mmol/L    21-32                     

 

             anion gap (test code = anion gap) 19.2 mEq/L   12-20               

      

 

             calcium level (test code = calcium 9.2 mg/dL    8.6-10.0           

       



             level)                                              

 

             total protein (test code = total 7.7 g/dL     6.6-8.7              

     



             protein)                                            

 

             albumin (test code = albumin) 4.0 g/dL     3.5-5.2                 

  

 

             globulin (test code = globulin) 3.7 gm/dL                          

    

 

             A/G ratio (test code = A/G ratio) 1.1          >1.0                

      

 

             bilirubin,total (test code = 0.4 mg/dL    0.0-1.2                  

 



             bilirubin,total)                                        

 

             AST/SGOT (test code = AST/SGOT) 33 U/L       15-40                 

    

 

             Alanine aminotransferase 33 U/L       0-41                      



             [Enzymatic activity/volume] in                                     

   



             Serum or Plasma (test code =                                       

 



             1742-6)                                             

 

             Alkaline phosphatase [Enzymatic 88 U/L                       

    



             activity/volume] in Serum or                                       

 



             Plasma (test code = 6768-6)                                        



[facility] Center[facility]  panel - Serum or [id number] 06:25:00





             Test Item    Value        Reference Range Interpretation Comments

 

             cholesterol level (test code = 137 mg/dL    150-200      L         

   



             cholesterol level)                                        

 

             triglycerides level (test code = 94 mg/dL     <150                 

     



             triglycerides level)                                        

 

             HDL cholesterol (test code = HDL 38 mg/dL     >55          L       

     



             cholesterol)                                        

 

             LDL cholesterol direct (test code = 91 mg/dL     <100              

        



             LDL cholesterol direct)                                        

 

             cholesterol risk ratio (test code = 3.605                          

        



             cholesterol risk ratio)                                        



[facility] CenterTh[facility]in [Units/volume] in Serum or [id number] 
06:25:00





             Test Item    Value        Reference Range Interpretation Comments

 

             Thyrotropin [Units/volume] in 1.89 uIU/mL  0.36-3.74               

  



             Serum or Plasma (test code =                                       

 



             3016-3)                                             



[facility] CenterHem[facility] A1c [Mass/volume] in [id number] 12:17:00





             Test Item    Value        Reference Range Interpretation Comments

 

             Hemoglobin A1c in Blood (test code = 6.1 %        4.0-6.0      H   

         



             10229-8)                                            



[facility] CenterH[facility] A1c [Mass/volume] in [id number] 12:17:00





             Test Item    Value        Reference Range Interpretation Comments

 

             Hemoglobin A1c in Blood (test code = 6.1 %        4.0-6.0      H   

         



             73265-4)                                            



[facility] CenterHemoglobin A1c [Mass/volume] in [id number] 12:17:00





             Test Item    Value        Reference Range Interpretation Comments

 

             Deprecated Hemoglobin A1c in Blood 6.1 %        4.0-6.0      H     

       



             (test code = 55454-3)                                        



[facility] CenterGlucose [Mass/volume] in Serum or [id number] 
06:30:00





             Test Item    Value        Reference Range Interpretation Comments

 

             Glucose [Mass/volume] in Serum or 106 mg/dL                  

      



             Plasma (test code = 2345-7)                                        



[facility] CenterLip[facility] 1996 panel - Serum or [id number] 06:30:00





             Test Item    Value        Reference Range Interpretation Comments

 

             cholesterol level (test code = 143 mg/dL    150-200      L         

   



             cholesterol level)                                        

 

             triglycerides level (test code = 116 mg/dL    <150                 

     



             triglycerides level)                                        

 

             HDL cholesterol (test code = HDL 37 mg/dL     >55          L       

     



             cholesterol)                                        

 

             LDL cholesterol direct (test code = 95 mg/dL     <100              

        



             LDL cholesterol direct)                                        

 

             cholesterol risk ratio (test code = 3.864                          

        



             cholesterol risk ratio)                                        



[facility] CenterGlucose [Mass/volume] in Serum or [id number] 
06:30:00





             Test Item    Value        Reference Range Interpretation Comments

 

             Glucose [Mass/volume] in Serum or 106 mg/dL                  

      



             Plasma (test code = 2345-7)                                        



[facility] CenterLipid  panel - Serum or [id number] 06:30:00





             Test Item    Value        Reference Range Interpretation Comments

 

             cholesterol level (test code = 143 mg/dL    150-200      L         

   



             cholesterol level)                                        

 

             triglycerides level (test code = 116 mg/dL    <150                 

     



             triglycerides level)                                        

 

             HDL cholesterol (test code = HDL 37 mg/dL     >55          L       

     



             cholesterol)                                        

 

             LDL cholesterol direct (test code = 95 mg/dL     <100              

        



             LDL cholesterol direct)                                        

 

             cholesterol risk ratio (test code = 3.864                          

        



             cholesterol risk ratio)                                        



[facility] CenterGlucose [Mass/volume] in Serum or [id number] 
06:30:00





             Test Item    Value        Reference Range Interpretation Comments

 

             Glucose [Mass/volume] in Serum or 106 mg/dL                  

      



             Plasma (test code = 2345-7)                                        



[facility]  panel - Serum or [id number] 06:30:00





             Test Item    Value        Reference Range Interpretation Comments

 

             cholesterol level (test code = 143 mg/dL    150-200      L         

   



             cholesterol level)                                        

 

             triglycerides level (test code = 116 mg/dL    <150                 

     



             triglycerides level)                                        

 

             HDL cholesterol (test code = HDL 37 mg/dL     >55          L       

     



             cholesterol)                                        

 

             LDL cholesterol direct (test code = 95 mg/dL     <100              

        



             LDL cholesterol direct)                                        

 

             cholesterol risk ratio (test code = 3.864                          

        



             cholesterol risk ratio)                                        



[facility] CenterErythrocyte sedimentation [id number] 06:17:00





             Test Item    Value        Reference Range Interpretation Comments

 

             erythrocyte sedimentation rate (test 20 mm/HR     0.00-20          

         



             code = erythrocyte sedimentation                                   

     



             rate)                                               



[facility] CenterComprehensive [facility] 2000 panel - Serum or Plasma
2019 06:17:00





             Test Item    Value        Reference Range Interpretation Comments

 

             glucose (test code = glucose) 106 mg/dL                      

  

 

             Urea nitrogen [Mass/volume] in 13 mg/dL     6-20                   

   



             Serum or Plasma (test code =                                       

 



             3094-0)                                             

 

             Osmolality of Serum or Plasma 278          280-300      L          

  



             (test code = 2692-2)                                        

 

             creatinine (test code = 0.7 mg/dL    0.70-1.20                 



             creatinine)                                         

 

             glomerular filtration rate (test >60.00                            

     



             code = glomerular filtration rate)                                 

       

 

             Urea nitrogen/Creatinine [Mass 18.6         12-20                  

   



             Ratio] in Serum or Plasma (test                                    

    



             code = 3097-3)                                        

 

             sodium level (test code = sodium 139 mmol/L   135-145              

     



             level)                                              

 

             Potassium [Moles/volume] in Body 4.4 mmol/L   3.5-5.2              

     



             fluid (test code = 2821-7)                                        

 

             chloride level (test code = 98 mmol/L                        



             chloride level)                                        

 

             CO2 (test code = CO2) 32 mmol/L    21-32                     

 

             anion gap (test code = anion gap) 13.4 mEq/L   12-20               

      

 

             calcium level (test code = calcium 9.6 mg/dL    8.6-10.0           

       



             level)                                              

 

             total protein (test code = total 7.6 g/dL     6.6-8.7              

     



             protein)                                            

 

             albumin (test code = albumin) 4.1 g/dL     3.5-5.2                 

  

 

             globulin (test code = globulin) 3.5 gm/dL                          

    

 

             A/G ratio (test code = A/G ratio) 1.2          >1.0                

      

 

             bilirubin,total (test code = 0.4 mg/dL    0.0-1.2                  

 



             bilirubin,total)                                        

 

             AST/SGOT (test code = AST/SGOT) 38 U/L       15-40                 

    

 

             Alanine aminotransferase 39 U/L       0-41                      



             [Enzymatic activity/volume] in                                     

   



             Serum or Plasma (test code =                                       

 



             1742-6)                                             

 

             Alkaline phosphatase [Enzymatic 83 U/L                       

    



             activity/volume] in Serum or                                       

 



             Plasma (test code = 6768-6)                                        



[facility] CenterC reactive protein [Mass/volume] in Serum or Plasma by 
High sensitivity [id number] 06:17:00





             Test Item    Value        Reference Range Interpretation Comments

 

             C-reactive protein high sens. (test 2.0 mg/L     0.0-5.0           

        



             code = C-reactive protein high                                     

   



             sens.)                                              



[facility] CenterLip[facility] 1996 panel - Serum or [id number] 06:17:00





             Test Item    Value        Reference Range Interpretation Comments

 

             cholesterol level (test code = 138 mg/dL    150-200      L         

   



             cholesterol level)                                        

 

             triglycerides level (test code = 88 mg/dL     <150                 

     



             triglycerides level)                                        

 

             HDL cholesterol (test code = HDL 37 mg/dL     >55          L       

     



             cholesterol)                                        

 

             LDL cholesterol direct (test code = 97 mg/dL     <100              

        



             LDL cholesterol direct)                                        

 

             cholesterol risk ratio (test code = 3.729                          

        



             cholesterol risk ratio)                                        



[facility] CenterRheumatoid factor [Titer] in [id number] 06:17:00





             Test Item    Value        Reference Range Interpretation Comments

 

             rheum factor (test code = rheum factor) <10          0-14          

            



[facility] CenterThyrotropin [Units/volume] in Serum or [id number] 
06:17:00





             Test Item    Value        Reference Range Interpretation Comments

 

             Thyrotropin [Units/volume] in 2.09 uIU/mL  0.36-3.74               

  



             Serum or Plasma (test code =                                       

 



             3016-3)                                             



[facility] CenterErythrocyte sedimentation [id number] 06:17:00





             Test Item    Value        Reference Range Interpretation Comments

 

             erythrocyte sedimentation rate (test 20 mm/HR     0.00-20          

         



             code = erythrocyte sedimentation                                   

     



             rate)                                               



[facility] CenterCom[facility] 2000 panel - Serum or Plasma
2019 06:17:00





             Test Item    Value        Reference Range Interpretation Comments

 

             glucose (test code = glucose) 106 mg/dL                      

  

 

             Urea nitrogen [Mass/volume] in 13 mg/dL     6-20                   

   



             Serum or Plasma (test code =                                       

 



             3094-0)                                             

 

             Osmolality of Serum or Plasma 278          280-300      L          

  



             (test code = 2692-2)                                        

 

             creatinine (test code = 0.7 mg/dL    0.70-1.20                 



             creatinine)                                         

 

             glomerular filtration rate (test >60.00                            

     



             code = glomerular filtration rate)                                 

       

 

             Urea nitrogen/Creatinine [Mass 18.6         12-20                  

   



             Ratio] in Serum or Plasma (test                                    

    



             code = 3097-3)                                        

 

             sodium level (test code = sodium 139 mmol/L   135-145              

     



             level)                                              

 

             Potassium [Moles/volume] in Body 4.4 mmol/L   3.5-5.2              

     



             fluid (test code = 2821-7)                                        

 

             chloride level (test code = 98 mmol/L                        



             chloride level)                                        

 

             CO2 (test code = CO2) 32 mmol/L    21-32                     

 

             anion gap (test code = anion gap) 13.4 mEq/L   12-20               

      

 

             calcium level (test code = calcium 9.6 mg/dL    8.6-10.0           

       



             level)                                              

 

             total protein (test code = total 7.6 g/dL     6.6-8.7              

     



             protein)                                            

 

             albumin (test code = albumin) 4.1 g/dL     3.5-5.2                 

  

 

             globulin (test code = globulin) 3.5 gm/dL                          

    

 

             A/G ratio (test code = A/G ratio) 1.2          >1.0                

      

 

             bilirubin,total (test code = 0.4 mg/dL    0.0-1.2                  

 



             bilirubin,total)                                        

 

             AST/SGOT (test code = AST/SGOT) 38 U/L       15-40                 

    

 

             Alanine aminotransferase 39 U/L       0-41                      



             [Enzymatic activity/volume] in                                     

   



             Serum or Plasma (test code =                                       

 



             1742-6)                                             

 

             Alkaline phosphatase [Enzymatic 83 U/L                       

    



             activity/volume] in Serum or                                       

 



             Plasma (test code = 6768-6)                                        



[facility] CenterC reactive protein [Mass/volume] in Serum or Plasma by 
High sensitivity [id number] 06:17:00





             Test Item    Value        Reference Range Interpretation Comments

 

             C-reactive protein high sens. (test 2.0 mg/L     0.0-5.0           

        



             code = C-reactive protein high                                     

   



             sens.)                                              



[facility] CenterLip[facility] 1996 panel - Serum or [id number] 06:17:00





             Test Item    Value        Reference Range Interpretation Comments

 

             cholesterol level (test code = 138 mg/dL    150-200      L         

   



             cholesterol level)                                        

 

             triglycerides level (test code = 88 mg/dL     <150                 

     



             triglycerides level)                                        

 

             HDL cholesterol (test code = HDL 37 mg/dL     >55          L       

     



             cholesterol)                                        

 

             LDL cholesterol direct (test code = 97 mg/dL     <100              

        



             LDL cholesterol direct)                                        

 

             cholesterol risk ratio (test code = 3.729                          

        



             cholesterol risk ratio)                                        



[facility] CenterRheumatoid factor [Titer] in [id number] 06:17:00





             Test Item    Value        Reference Range Interpretation Comments

 

             rheum factor (test code = rheum factor) <10          0-14          

            



[facility] CenterThyrotropin [Units/volume] in Serum or [id number] 
06:17:00





             Test Item    Value        Reference Range Interpretation Comments

 

             Thyrotropin [Units/volume] in 2.09 uIU/mL  0.36-3.74               

  



             Serum or Plasma (test code =                                       

 



             3016-3)                                             



[facility] CenterErythrocyte sedimentation [id number] 06:17:00





             Test Item    Value        Reference Range Interpretation Comments

 

             erythrocyte sedimentation rate (test 20 mm/HR     0.00-20          

         



             code = erythrocyte sedimentation                                   

     



             rate)                                               



[facility] CenterCom[facility] 2000 panel - Serum or Plasma
2019 06:17:00





             Test Item    Value        Reference Range Interpretation Comments

 

             glucose (test code = glucose) 106 mg/dL                      

  

 

             Urea nitrogen [Mass/volume] in 13 mg/dL     6-20                   

   



             Serum or Plasma (test code =                                       

 



             3094-0)                                             

 

             Osmolality of Serum or Plasma 278          280-300      L          

  



             (test code = 2692-2)                                        

 

             creatinine (test code = 0.7 mg/dL    0.70-1.20                 



             creatinine)                                         

 

             glomerular filtration rate (test >60.00                            

     



             code = glomerular filtration rate)                                 

       

 

             Urea nitrogen/Creatinine [Mass 18.6         12-20                  

   



             Ratio] in Serum or Plasma (test                                    

    



             code = 3097-3)                                        

 

             sodium level (test code = sodium 139 mmol/L   135-145              

     



             level)                                              

 

             Potassium [Moles/volume] in Body 4.4 mmol/L   3.5-5.2              

     



             fluid (test code = 2821-7)                                        

 

             chloride level (test code = 98 mmol/L                        



             chloride level)                                        

 

             CO2 (test code = CO2) 32 mmol/L    21-32                     

 

             anion gap (test code = anion gap) 13.4 mEq/L   12-20               

      

 

             calcium level (test code = calcium 9.6 mg/dL    8.6-10.0           

       



             level)                                              

 

             total protein (test code = total 7.6 g/dL     6.6-8.7              

     



             protein)                                            

 

             albumin (test code = albumin) 4.1 g/dL     3.5-5.2                 

  

 

             globulin (test code = globulin) 3.5 gm/dL                          

    

 

             A/G ratio (test code = A/G ratio) 1.2          >1.0                

      

 

             bilirubin,total (test code = 0.4 mg/dL    0.0-1.2                  

 



             bilirubin,total)                                        

 

             AST/SGOT (test code = AST/SGOT) 38 U/L       15-40                 

    

 

             Alanine aminotransferase 39 U/L       0-41                      



             [Enzymatic activity/volume] in                                     

   



             Serum or Plasma (test code =                                       

 



             1742-6)                                             

 

             Alkaline phosphatase [Enzymatic 83 U/L                       

    



             activity/volume] in Serum or                                       

 



             Plasma (test code = 6768-6)                                        



[facility] CenterC reactive protein [Mass/volume] in Serum or Plasma by 
High sensitivity [id number] 06:17:00





             Test Item    Value        Reference Range Interpretation Comments

 

             C-reactive protein high sens. (test 2.0 mg/L     0.0-5.0           

        



             code = C-reactive protein high                                     

   



             sens.)                                              



[facility] CenterLipid 1996 panel - Serum or [id number] 06:17:00





             Test Item    Value        Reference Range Interpretation Comments

 

             cholesterol level (test code = 138 mg/dL    150-200      L         

   



             cholesterol level)                                        

 

             triglycerides level (test code = 88 mg/dL     <150                 

     



             triglycerides level)                                        

 

             HDL cholesterol (test code = HDL 37 mg/dL     >55          L       

     



             cholesterol)                                        

 

             LDL cholesterol direct (test code = 97 mg/dL     <100              

        



             LDL cholesterol direct)                                        

 

             cholesterol risk ratio (test code = 3.729                          

        



             cholesterol risk ratio)                                        



[facility] CenterRheumatoid factor [Titer] in [id number] 06:17:00





             Test Item    Value        Reference Range Interpretation Comments

 

             rheum factor (test code = rheum factor) <10          0-14          

            



[facility] CenterThyrotropin [Units/volume] in Serum or [id number] 
06:17:00





             Test Item    Value        Reference Range Interpretation Comments

 

             Thyrotropin [Units/volume] in 2.09 uIU/mL  0.36-3.74               

  



             Serum or Plasma (test code =                                       

 



             3016-3)                                             



[facility] CenterBacteria identified in Urine by [id number] 
00:00:00Bacteria Ur [facility]Bacteria [facility] Urine by 
[id number] 00:00:00Bacteria Ur [facility]

## 2020-11-18 NOTE — EKG
Test Date:    2020-11-17               Test Time:    07:52:05

Technician:   MARK                                   

                                                     

MEASUREMENT RESULTS:                                       

Intervals:                                           

Rate:         61                                     

MN:           160                                    

QRSD:         108                                    

QT:           424                                    

QTc:          426                                    

Axis:                                                

P:            45                                     

MN:           160                                    

QRS:          45                                     

T:            35                                     

                                                     

INTERPRETIVE STATEMENTS:                                       

                                                     

Normal sinus rhythm

Normal ECG

No previous ECG available for comparison



Electronically Signed On 11-18-20 07:17:44 CST by Anant Mcdonald

## 2020-11-18 NOTE — OP
Surgeon:  MCKINLEY SALAS



Preoperative Diagnosis:  Phimosis and nodular posthitis.



Postoperative Diagnosis:  Phimosis and nodular posthitis.



Principal Procedure:  Redo circumcision.



Indication For Procedure:  Mr. Donaldson presented to the [facility] with complaints of irritation
 and cracking of his foreskin.  He had previously undergone a circumcision by an outside provider, bu
t insufficient tissue was removed and some nodular tender areas developed that never resolved.  As a 
result, he wished to have the residual problem corrected and presented today for that operative manag
ement.



Procedure In Detail:  The patient was consented in the preoperative holding area before being transfe
rred to the operative suite where sedation was provided.  A penile block using 40 cc of a mixture of 
0.5% Marcaine and 1% lidocaine in 50:50 mixture was provided in the infrapubic region and in the portillo
on of the neurovascular bundles bilaterally.  An additional several cubic centimeters was applied in 
the frenular region until the block was adequately achieved.  His genitalia had been prepped with Bet
adine and draped in standard fashion prior to application of the block and a marking pen was used to 
delineate the region outside of the densely fibrotic and nodular abnormal appearing foreskin to achie
ve a component of normal skin and the shaft skin as well as with the preputial margin.  The skin was 
incised using a 15 blade and deepened through the subcutaneous tissues and to the dartos, which was l
ikewise incised until the entirety of the skin was mobile.  The intervening residual prepuce was vernell
darren by dividing it anteriorly between the incised components of the skin and removing it using electr
ocautery from its dartos attachments.  Careful search for bleeding was then performed using Adson for
ceps and electrocautery in a bipolar fashion.  Once complete hemostasis had been achieved, copious ir
rigation was applied and any additional bleeding vessels were then pinpoint fulgurated.  I then reapp
roximated the preputial margin to the shaft skin in a quadrant fashion, so the intervening skin in a 
horizontal running mattress fashion using 3-0 chromic suture dipped in Bacitracin.  In the end, the c
osmetic result was excellent, and Bacitracin was applied to the incision line prior to application of
 a Denise and Coban for gentle pressure dressing.  The patient tolerated the procedure well, and was a
wakened from sedation before being transferred to a stretcher and then to the recovery room in good c
ondition.



Complications:  None.



Disposition:  He was instructed to keep the area clean and apply Neosporin or triple antibiotic ointm
ent to the incision line twice daily.  He was instructed to avoid beaches, tub bath, or hot tubs unti
l the incision was completely healed, and he had perviously been instructed to avoid sexual activity 
during this time.  Follow up in 1-2 weeks for interval assessment.





DULCE/RANDEE

DD:  11/17/2020 12:33:06Voice ID:  [id number]

DT:  11/18/2020 00:16:41Report ID:  [id number]